# Patient Record
Sex: MALE | Race: OTHER | NOT HISPANIC OR LATINO | Employment: FULL TIME | URBAN - METROPOLITAN AREA
[De-identification: names, ages, dates, MRNs, and addresses within clinical notes are randomized per-mention and may not be internally consistent; named-entity substitution may affect disease eponyms.]

---

## 2023-02-26 ENCOUNTER — APPOINTMENT (OUTPATIENT)
Dept: GENERAL RADIOLOGY | Facility: HOSPITAL | Age: 28
End: 2023-02-26

## 2023-02-26 ENCOUNTER — HOSPITAL ENCOUNTER (EMERGENCY)
Facility: HOSPITAL | Age: 28
Discharge: HOME | End: 2023-02-26
Attending: EMERGENCY MEDICINE | Admitting: EMERGENCY MEDICINE

## 2023-02-26 VITALS
HEART RATE: 60 BPM | DIASTOLIC BLOOD PRESSURE: 107 MMHG | WEIGHT: 160 LBS | RESPIRATION RATE: 18 BRPM | SYSTOLIC BLOOD PRESSURE: 147 MMHG | OXYGEN SATURATION: 100 % | BODY MASS INDEX: 27.31 KG/M2 | HEIGHT: 64 IN | TEMPERATURE: 97.6 F

## 2023-02-26 DIAGNOSIS — S83.91XA SPRAIN OF RIGHT KNEE, UNSPECIFIED LIGAMENT, INITIAL ENCOUNTER: Primary | ICD-10-CM

## 2023-02-26 PROCEDURE — 99283 EMERGENCY DEPT VISIT LOW MDM: CPT

## 2023-02-26 PROCEDURE — 73560 X-RAY EXAM OF KNEE 1 OR 2: CPT

## 2023-02-26 RX ORDER — IBUPROFEN 600 MG/1
600 TABLET ORAL EVERY 8 HOURS PRN
Qty: 20 TABLET | Refills: 0 | Status: SHIPPED | OUTPATIENT
Start: 2023-02-26

## 2023-03-01 ENCOUNTER — OFFICE VISIT (OUTPATIENT)
Dept: ORTHOPEDIC SURGERY | Facility: CLINIC | Age: 28
End: 2023-03-01
Payer: MEDICAID

## 2023-03-01 VITALS — HEIGHT: 64 IN | BODY MASS INDEX: 26.98 KG/M2 | WEIGHT: 158 LBS | TEMPERATURE: 97.8 F

## 2023-03-01 DIAGNOSIS — S83.411A SPRAIN OF MEDIAL COLLATERAL LIGAMENT OF RIGHT KNEE, INITIAL ENCOUNTER: ICD-10-CM

## 2023-03-01 DIAGNOSIS — M25.561 ACUTE PAIN OF RIGHT KNEE: Primary | ICD-10-CM

## 2023-03-01 PROCEDURE — 99203 OFFICE O/P NEW LOW 30 MIN: CPT | Performed by: NURSE PRACTITIONER

## 2023-03-01 NOTE — PROGRESS NOTES
Patient Name: Marquis Thomas   YOB: 1995  Referring Primary Care Physician: Provider, No Known  BMI: Body mass index is 27.12 kg/m².    Chief Complaint:    Chief Complaint   Patient presents with   • Right Knee - Pain        HPI: New pt here for right knee pain that happened snow boarding when he toed in and board was stuck and pt twisted knee and heard a pop last sat. Pt had pain that night and was unable to sleep. Pt went to [facility] and was placed in an immobilizer and crutches. Pt's pain has improved and continues to hurt worse on the inside of his knee.     Marquis Thomas is a 27 y.o. male who presents today for evaluation of   Chief Complaint   Patient presents with   • Right Knee - Pain         Subjective   Medications:   Home Medications:  Current Outpatient Medications on File Prior to Visit   Medication Sig   • ibuprofen (ADVIL,MOTRIN) 600 MG tablet Take 1 tablet by mouth Every 8 (Eight) Hours As Needed for Mild Pain.     No current facility-administered medications on file prior to visit.     Current Medications:  Scheduled Meds:  Continuous Infusions:No current facility-administered medications for this visit.    PRN Meds:.    I have reviewed the patient's medical history in detail and updated the computerized patient record.  Review and summarization of old records includes:    History reviewed. No pertinent past medical history.   History reviewed. No pertinent surgical history.     Social History     Occupational History   • Not on file   Tobacco Use   • Smoking status: Not on file     Passive exposure: Never   • Smokeless tobacco: Current   • Tobacco comments:     Vape   Substance and Sexual Activity   • Alcohol use: Yes     Comment: socially   • Drug use: Defer   • Sexual activity: Defer      Social History     Social History Narrative   • Not on file        Family History   Problem Relation Age of Onset   • Cancer Mother         breast   • Multiple sclerosis Father        ROS: 14  "point review of systems was performed and all other systems were reviewed and are negative except for documented findings in HPI and today's encounter.     Allergies:   Allergies   Allergen Reactions   • Sulfa Antibiotics Rash     Constitutional:  Denies fever, shaking or chills   Eyes:  Denies change in visual acuity   HENT:  Denies nasal congestion or sore throat   Respiratory:  Denies cough or shortness of breath   Cardiovascular:  Denies chest pain or severe LE edema   GI:  Denies abdominal pain, nausea, vomiting, bloody stools or diarrhea   Musculoskeletal:  Numbness, tingling, pain, or loss of motor function only as noted above in history of present illness.  : Denies painful urination or hematuria  Integument:  Denies rash, lesion or ulceration   Neurologic:  Denies headache or focal weakness  Endocrine:  Denies lymphadenopathy  Psych:  Denies confusion or change in mental status   Hem:  Denies active bleeding    OBJECTIVE:  Physical Exam: 27 y.o. male  Wt Readings from Last 3 Encounters:   03/01/23 71.7 kg (158 lb)   02/26/23 72.6 kg (160 lb)     Ht Readings from Last 1 Encounters:   03/01/23 162.6 cm (64\")     Body mass index is 27.12 kg/m².  Vitals:    03/01/23 1026   Temp: 97.8 °F (36.6 °C)     Vital signs reviewed.     General Appearance:    Alert, cooperative, in no acute distress                  Eyes: conjunctiva clear  ENT: external ears and nose atraumatic  CV: no peripheral edema  Resp: normal respiratory effort  Skin: no rashes or wounds; normal turgor  Psych: mood and affect appropriate  Lymph: no nodes appreciated  Neuro: gross sensation intact  Vascular:  Palpable peripheral pulse in noted extremity  Musculoskeletal Extremities: Skin warm dry intact with good pulses mood sensation he is point tenderness over the medial collateral ligament unable to do ligamentous exam or Edi's due to discomfort effusion noted medially calf is soft and nontender    Radiology:   Reviewed 2/26/23 from [facility] "   XR KNEE 1 OR 2 VW RIGHT-     INDICATIONS: Trauma     TECHNIQUE: 3 views of the right knee     COMPARISON: None available     FINDINGS:     No acute fracture, erosion, or dislocation is identified. Mild medial  tibiofemoral joint space narrowing is seen. Minimal knee effusion. If  there is clinical suspicion for internal derangement of the knee, MRI  can be considered for further evaluation.     IMPRESSION:     As described.     This report was finalized on 2/26/2023 11:00 AM by Dr. Sergio Mei M.D.       Assessment:     ICD-10-CM ICD-9-CM   1. Acute pain of right knee  M25.561 719.46   2. Sprain of medial collateral ligament of right knee, initial encounter  S83.411A 844.1        Procedures    MRI - crutches and knee immobilizer   MDM/Plan:   The diagnosis(es), natural history, pathophysiology and treatment for diagnosis(es) were discussed. Opportunity given and questions answered.  Biomechanics of pertinent body areas discussed.  When appropriate, the use of ambulatory aids discussed.  MEDICATIONS:  The risks, benefits, warnings,side effects and alternatives of medications discussed.  Inflammation/pain control; with cold, heat, elevation and/or liniments discussed as appropriate  MRI.  MEDICAL RECORDS reviewed from other provider(s) for past and current medical history pertinent to this complaint.  Patient is self-pay will need to get with [facility] to see if he can apply for Medicaid other options would be to talk to them and possible referral to [facility]    3/1/2023    Much of this encounter note is an electronic transcription/translation of spoken language to printed text. The electronic translation of spoken language may permit erroneous, or at times, nonsensical words or phrases to be inadvertently transcribed; Although I have reviewed the note for such errors, some may still exist

## 2023-03-31 ENCOUNTER — HOSPITAL ENCOUNTER (OUTPATIENT)
Dept: MRI IMAGING | Facility: HOSPITAL | Age: 28
Discharge: HOME | End: 2023-03-31
Admitting: NURSE PRACTITIONER
Payer: MEDICAID

## 2023-03-31 DIAGNOSIS — M25.561 ACUTE PAIN OF RIGHT KNEE: ICD-10-CM

## 2023-03-31 DIAGNOSIS — S83.411A SPRAIN OF MEDIAL COLLATERAL LIGAMENT OF RIGHT KNEE, INITIAL ENCOUNTER: ICD-10-CM

## 2023-03-31 PROCEDURE — 73721 MRI JNT OF LWR EXTRE W/O DYE: CPT

## 2023-04-05 ENCOUNTER — OFFICE VISIT (OUTPATIENT)
Dept: SPORTS MEDICINE | Facility: CLINIC | Age: 28
End: 2023-04-05
Payer: MEDICAID

## 2023-04-05 VITALS
BODY MASS INDEX: 26.8 KG/M2 | HEART RATE: 58 BPM | WEIGHT: 157 LBS | TEMPERATURE: 98 F | OXYGEN SATURATION: 98 % | SYSTOLIC BLOOD PRESSURE: 118 MMHG | HEIGHT: 64 IN | DIASTOLIC BLOOD PRESSURE: 78 MMHG

## 2023-04-05 DIAGNOSIS — S83.411A SPRAIN OF MEDIAL COLLATERAL LIGAMENT OF RIGHT KNEE, INITIAL ENCOUNTER: Primary | ICD-10-CM

## 2023-04-05 NOTE — PROGRESS NOTES
"Chief Complaint  Knee Pain (RT KNEE- Already getting PT for a sprained MCL. Sprain happened about 6 weeks ago)    Subjective        Marquis Thomas presents to [facility]  History of Present Illness  6 weeks ago patient had a valgus injury to the right knee while snowboarding.  Patient had moderate swelling and medial joint pain.  Patient went to ER and x-rays were done which was negative, patient given crutches and hinged knee brace-followed up with Ortho APRN and MRI was obtained.  Patient states that his knee is considerably better however still has some mild discomfort over the medial portion of the knee.  He has not gone back to the gym but he has been able to wean out of crutches and his knee brace.  Objective   Vital Signs:  /78 (BP Location: Left arm, Patient Position: Sitting, Cuff Size: Adult)   Pulse 58   Temp 98 °F (36.7 °C) (Temporal)   Ht 162.6 cm (64.02\")   Wt 71.2 kg (157 lb)   SpO2 98%   BMI 26.94 kg/m²   Estimated body mass index is 26.94 kg/m² as calculated from the following:    Height as of this encounter: 162.6 cm (64.02\").    Weight as of this encounter: 71.2 kg (157 lb).             Physical Exam  Vitals reviewed.   Constitutional:       Appearance: He is well-developed.   HENT:      Head: Normocephalic and atraumatic.   Eyes:      Conjunctiva/sclera: Conjunctivae normal.      Pupils: Pupils are equal, round, and reactive to light.   Cardiovascular:      Comments: No peripheral edema  Pulmonary:      Effort: Pulmonary effort is normal.   Musculoskeletal:      Comments: Right knee normal in general appearance.  Extensor mechanism intact.  Patient has full range of motion of the knee.  Patient does have some very mild tenderness to palpation over the proximal MCL footprint.  Patient has no opening with valgus stress of the knee either in extension or flexion.  It is comparable to the left knee.  Negative Lachman and Edi.   Skin:     General: " Skin is warm and dry.   Neurological:      Mental Status: He is alert and oriented to person, place, and time.   Psychiatric:         Behavior: Behavior normal.        Result Review :          XR Knee 1 or 2 View Right (02/26/2023 10:42)-reviewed images and agree with impression  MRI Knee Right Without Contrast (03/31/2023 15:55)-reviewed images and agree with impression  ED Provider Notes by Onel Mckay MD (02/26/2023 10:25)  Progress Notes by Risa Araiza APRN (03/01/2023 10:00)           Assessment and Plan   Diagnoses and all orders for this visit:    1. Sprain of medial collateral ligament of right knee, initial encounter (Primary)  -     Ambulatory Referral to Physical Therapy    Grade 2 sprain proximal MCL right knee-patient is progressing as expected, will add formal physical therapy.  Suggest holding off on any sporting activity which requires lateral movement of the lower extremities.  I would wait on returning to weightlifting for another couple of weeks, for the first week or 2 would recommend working with machines then if he is having no pain may progress to free weights.  Follow-up if he is having any problems at all with the right knee in 3 to 4 weeks or sooner if needed.         Follow Up   No follow-ups on file.  Patient was given instructions and counseling regarding his condition or for health maintenance advice. Please see specific information pulled into the AVS if appropriate.